# Patient Record
Sex: FEMALE | Race: ASIAN | Employment: FULL TIME | URBAN - METROPOLITAN AREA
[De-identification: names, ages, dates, MRNs, and addresses within clinical notes are randomized per-mention and may not be internally consistent; named-entity substitution may affect disease eponyms.]

---

## 2017-04-12 PROBLEM — M17.12 OSTEOARTHRITIS OF LEFT KNEE, UNSPECIFIED OSTEOARTHRITIS TYPE: Status: ACTIVE | Noted: 2017-04-12

## 2017-11-01 NOTE — LETTER
11/15/24      RE: RACHEL POLANCO  [address] [facility] [address]       To Whom It May Concern:    Rachel Polanco is a patient under my medical care.  She underwent a surgical procedure on 10/22/24 and continues to recover.  She is restricted to lifting no more than 10 pounds.  She will be unable to lift and carry her luggage due to this restriction.  Please provide baggage-handling assistance during her travels.    Thank you.      Grace Gayle MD  
independent

## 2024-10-22 ENCOUNTER — APPOINTMENT (OUTPATIENT)
Dept: ULTRASOUND IMAGING | Facility: HOSPITAL | Age: 54
End: 2024-10-22
Attending: STUDENT IN AN ORGANIZED HEALTH CARE EDUCATION/TRAINING PROGRAM
Payer: COMMERCIAL

## 2024-10-22 ENCOUNTER — HOSPITAL ENCOUNTER (INPATIENT)
Facility: HOSPITAL | Age: 54
LOS: 1 days | Discharge: HOME | End: 2024-10-23
Attending: STUDENT IN AN ORGANIZED HEALTH CARE EDUCATION/TRAINING PROGRAM | Admitting: STUDENT IN AN ORGANIZED HEALTH CARE EDUCATION/TRAINING PROGRAM
Payer: COMMERCIAL

## 2024-10-22 ENCOUNTER — APPOINTMENT (OUTPATIENT)
Dept: GENERAL RADIOLOGY | Facility: HOSPITAL | Age: 54
End: 2024-10-22
Attending: STUDENT IN AN ORGANIZED HEALTH CARE EDUCATION/TRAINING PROGRAM
Payer: COMMERCIAL

## 2024-10-22 ENCOUNTER — APPOINTMENT (OUTPATIENT)
Dept: ULTRASOUND IMAGING | Facility: HOSPITAL | Age: 54
DRG: 419 | End: 2024-10-22
Attending: STUDENT IN AN ORGANIZED HEALTH CARE EDUCATION/TRAINING PROGRAM
Payer: COMMERCIAL

## 2024-10-22 ENCOUNTER — HOSPITAL ENCOUNTER (OUTPATIENT)
Facility: HOSPITAL | Age: 54
Setting detail: OBSERVATION
Discharge: HOME | DRG: 419 | End: 2024-10-23
Attending: STUDENT IN AN ORGANIZED HEALTH CARE EDUCATION/TRAINING PROGRAM | Admitting: STUDENT IN AN ORGANIZED HEALTH CARE EDUCATION/TRAINING PROGRAM
Payer: COMMERCIAL

## 2024-10-22 ENCOUNTER — APPOINTMENT (OUTPATIENT)
Dept: GENERAL RADIOLOGY | Facility: HOSPITAL | Age: 54
DRG: 419 | End: 2024-10-22
Attending: STUDENT IN AN ORGANIZED HEALTH CARE EDUCATION/TRAINING PROGRAM
Payer: COMMERCIAL

## 2024-10-22 ENCOUNTER — HOSPITAL ENCOUNTER (OUTPATIENT)
Age: 54
Discharge: ANOTHER HOSPITAL | End: 2024-10-22
Payer: COMMERCIAL

## 2024-10-22 VITALS
DIASTOLIC BLOOD PRESSURE: 104 MMHG | RESPIRATION RATE: 18 BRPM | WEIGHT: 135 LBS | SYSTOLIC BLOOD PRESSURE: 123 MMHG | BODY MASS INDEX: 24 KG/M2 | OXYGEN SATURATION: 98 % | TEMPERATURE: 99 F | HEART RATE: 67 BPM

## 2024-10-22 DIAGNOSIS — K81.0 ACUTE CHOLECYSTITIS: Primary | ICD-10-CM

## 2024-10-22 DIAGNOSIS — R10.10 UPPER ABDOMINAL PAIN: Primary | ICD-10-CM

## 2024-10-22 DIAGNOSIS — K81.9 CHOLECYSTITIS: ICD-10-CM

## 2024-10-22 LAB
ALBUMIN SERPL-MCNC: 4.7 G/DL (ref 3.2–4.8)
ALBUMIN/GLOB SERPL: 1.3 {RATIO} (ref 1–2)
ALP LIVER SERPL-CCNC: 131 U/L
ALT SERPL-CCNC: 489 U/L
ANION GAP SERPL CALC-SCNC: 7 MMOL/L (ref 0–18)
AST SERPL-CCNC: 472 U/L (ref ?–34)
B-HCG UR QL: NEGATIVE
BASOPHILS # BLD AUTO: 0.03 X10(3) UL (ref 0–0.2)
BASOPHILS NFR BLD AUTO: 0.4 %
BILIRUB SERPL-MCNC: 1.7 MG/DL (ref 0.3–1.2)
BILIRUB UR QL CFM: NEGATIVE
BUN BLD-MCNC: 8 MG/DL (ref 9–23)
CALCIUM BLD-MCNC: 9.4 MG/DL (ref 8.7–10.4)
CHLORIDE SERPL-SCNC: 107 MMOL/L (ref 98–112)
CO2 SERPL-SCNC: 26 MMOL/L (ref 21–32)
COLOR UR AUTO: YELLOW
CREAT BLD-MCNC: 0.72 MG/DL
EGFRCR SERPLBLD CKD-EPI 2021: 101 ML/MIN/1.73M2 (ref 60–?)
EOSINOPHIL # BLD AUTO: 0.01 X10(3) UL (ref 0–0.7)
EOSINOPHIL NFR BLD AUTO: 0.1 %
ERYTHROCYTE [DISTWIDTH] IN BLOOD BY AUTOMATED COUNT: 12.4 %
GLOBULIN PLAS-MCNC: 3.5 G/DL (ref 2–3.5)
GLUCOSE BLD-MCNC: 120 MG/DL (ref 70–99)
GLUCOSE UR STRIP.AUTO-MCNC: NORMAL MG/DL
HCT VFR BLD AUTO: 37.9 %
HGB BLD-MCNC: 13.3 G/DL
IMM GRANULOCYTES # BLD AUTO: 0.02 X10(3) UL (ref 0–1)
IMM GRANULOCYTES NFR BLD: 0.2 %
KETONES UR STRIP.AUTO-MCNC: NEGATIVE MG/DL
LEUKOCYTE ESTERASE UR QL STRIP.AUTO: 500
LIPASE SERPL-CCNC: 54 U/L (ref 12–53)
LYMPHOCYTES # BLD AUTO: 1.74 X10(3) UL (ref 1–4)
LYMPHOCYTES NFR BLD AUTO: 20.7 %
MCH RBC QN AUTO: 29.2 PG (ref 26–34)
MCHC RBC AUTO-ENTMCNC: 35.1 G/DL (ref 31–37)
MCV RBC AUTO: 83.3 FL
MONOCYTES # BLD AUTO: 0.69 X10(3) UL (ref 0.1–1)
MONOCYTES NFR BLD AUTO: 8.2 %
NEUTROPHILS # BLD AUTO: 5.9 X10 (3) UL (ref 1.5–7.7)
NEUTROPHILS # BLD AUTO: 5.9 X10(3) UL (ref 1.5–7.7)
NEUTROPHILS NFR BLD AUTO: 70.4 %
NITRITE UR QL STRIP.AUTO: NEGATIVE
OSMOLALITY SERPL CALC.SUM OF ELEC: 290 MOSM/KG (ref 275–295)
PH UR STRIP.AUTO: 6.5 [PH] (ref 5–8)
PLATELET # BLD AUTO: 346 10(3)UL (ref 150–450)
POTASSIUM SERPL-SCNC: 3 MMOL/L (ref 3.5–5.1)
PROT SERPL-MCNC: 8.2 G/DL (ref 5.7–8.2)
PROT UR STRIP.AUTO-MCNC: NEGATIVE MG/DL
RBC # BLD AUTO: 4.55 X10(6)UL
RBC UR QL AUTO: NEGATIVE
SODIUM SERPL-SCNC: 140 MMOL/L (ref 136–145)
SP GR UR STRIP.AUTO: 1.01 (ref 1–1.03)
UROBILINOGEN UR STRIP.AUTO-MCNC: NORMAL MG/DL
WBC # BLD AUTO: 8.4 X10(3) UL (ref 4–11)
WBC #/AREA URNS AUTO: >50 /HPF

## 2024-10-22 PROCEDURE — 99205 OFFICE O/P NEW HI 60 MIN: CPT | Performed by: NURSE PRACTITIONER

## 2024-10-22 PROCEDURE — 99285 EMERGENCY DEPT VISIT HI MDM: CPT

## 2024-10-22 PROCEDURE — 87186 SC STD MICRODIL/AGAR DIL: CPT | Performed by: STUDENT IN AN ORGANIZED HEALTH CARE EDUCATION/TRAINING PROGRAM

## 2024-10-22 PROCEDURE — 71045 X-RAY EXAM CHEST 1 VIEW: CPT | Performed by: STUDENT IN AN ORGANIZED HEALTH CARE EDUCATION/TRAINING PROGRAM

## 2024-10-22 PROCEDURE — 87088 URINE BACTERIA CULTURE: CPT | Performed by: STUDENT IN AN ORGANIZED HEALTH CARE EDUCATION/TRAINING PROGRAM

## 2024-10-22 PROCEDURE — 81001 URINALYSIS AUTO W/SCOPE: CPT | Performed by: STUDENT IN AN ORGANIZED HEALTH CARE EDUCATION/TRAINING PROGRAM

## 2024-10-22 PROCEDURE — 96361 HYDRATE IV INFUSION ADD-ON: CPT

## 2024-10-22 PROCEDURE — 93010 ELECTROCARDIOGRAM REPORT: CPT

## 2024-10-22 PROCEDURE — 93005 ELECTROCARDIOGRAM TRACING: CPT

## 2024-10-22 PROCEDURE — 85025 COMPLETE CBC W/AUTO DIFF WBC: CPT | Performed by: STUDENT IN AN ORGANIZED HEALTH CARE EDUCATION/TRAINING PROGRAM

## 2024-10-22 PROCEDURE — 81025 URINE PREGNANCY TEST: CPT

## 2024-10-22 PROCEDURE — 96375 TX/PRO/DX INJ NEW DRUG ADDON: CPT

## 2024-10-22 PROCEDURE — 87086 URINE CULTURE/COLONY COUNT: CPT | Performed by: STUDENT IN AN ORGANIZED HEALTH CARE EDUCATION/TRAINING PROGRAM

## 2024-10-22 PROCEDURE — 83690 ASSAY OF LIPASE: CPT | Performed by: STUDENT IN AN ORGANIZED HEALTH CARE EDUCATION/TRAINING PROGRAM

## 2024-10-22 PROCEDURE — 96365 THER/PROPH/DIAG IV INF INIT: CPT

## 2024-10-22 PROCEDURE — 76700 US EXAM ABDOM COMPLETE: CPT | Performed by: STUDENT IN AN ORGANIZED HEALTH CARE EDUCATION/TRAINING PROGRAM

## 2024-10-22 PROCEDURE — 80053 COMPREHEN METABOLIC PANEL: CPT | Performed by: STUDENT IN AN ORGANIZED HEALTH CARE EDUCATION/TRAINING PROGRAM

## 2024-10-22 PROCEDURE — S0028 INJECTION, FAMOTIDINE, 20 MG: HCPCS | Performed by: STUDENT IN AN ORGANIZED HEALTH CARE EDUCATION/TRAINING PROGRAM

## 2024-10-22 RX ORDER — PROCHLORPERAZINE EDISYLATE 5 MG/ML
5 INJECTION INTRAMUSCULAR; INTRAVENOUS EVERY 8 HOURS PRN
Status: DISCONTINUED | OUTPATIENT
Start: 2024-10-22 | End: 2024-10-23

## 2024-10-22 RX ORDER — HYDROCODONE BITARTRATE AND ACETAMINOPHEN 5; 325 MG/1; MG/1
2 TABLET ORAL EVERY 4 HOURS PRN
Status: DISCONTINUED | OUTPATIENT
Start: 2024-10-22 | End: 2024-10-23

## 2024-10-22 RX ORDER — ACETAMINOPHEN 325 MG/1
650 TABLET ORAL EVERY 4 HOURS PRN
Status: DISCONTINUED | OUTPATIENT
Start: 2024-10-22 | End: 2024-10-23

## 2024-10-22 RX ORDER — ENOXAPARIN SODIUM 100 MG/ML
40 INJECTION SUBCUTANEOUS DAILY
Status: DISCONTINUED | OUTPATIENT
Start: 2024-10-23 | End: 2024-10-23

## 2024-10-22 RX ORDER — HYDROCODONE BITARTRATE AND ACETAMINOPHEN 5; 325 MG/1; MG/1
1 TABLET ORAL EVERY 4 HOURS PRN
Status: DISCONTINUED | OUTPATIENT
Start: 2024-10-22 | End: 2024-10-23

## 2024-10-22 RX ORDER — KETOROLAC TROMETHAMINE 30 MG/ML
30 INJECTION, SOLUTION INTRAMUSCULAR; INTRAVENOUS EVERY 6 HOURS
Status: DISCONTINUED | OUTPATIENT
Start: 2024-10-22 | End: 2024-10-23

## 2024-10-22 RX ORDER — ONDANSETRON 2 MG/ML
4 INJECTION INTRAMUSCULAR; INTRAVENOUS ONCE
Status: DISCONTINUED | OUTPATIENT
Start: 2024-10-22 | End: 2024-10-23

## 2024-10-22 RX ORDER — ONDANSETRON 2 MG/ML
4 INJECTION INTRAMUSCULAR; INTRAVENOUS EVERY 6 HOURS PRN
Status: DISCONTINUED | OUTPATIENT
Start: 2024-10-22 | End: 2024-10-23

## 2024-10-22 RX ORDER — FAMOTIDINE 10 MG/ML
20 INJECTION, SOLUTION INTRAVENOUS ONCE
Status: COMPLETED | OUTPATIENT
Start: 2024-10-22 | End: 2024-10-22

## 2024-10-22 NOTE — ED PROVIDER NOTES
Patient Seen in: [facility]      History     Chief Complaint   Patient presents with    Abdominal Pain     Stated Complaint: Stomach ache    Subjective:   54-year-old female accompanied by her , complaining of upper abdominal pain.  States symptoms started yesterday.  States it started across the lower abdomen.  But today seems to have moved to the upper abdomen.  She reports some nausea without vomiting.  Denies prior abdominal surgeries.  Denies chance of pregnancy.              Objective:     History reviewed. No pertinent past medical history.           Past Surgical History:   Procedure Laterality Date    Repair, complex, scalp/arms/legs, add'l 5.0 cm/<  11/5/2010    Performed by DENZEL BAIG [place] [facility] [place] [place]    Repr cmpl wnd scalp,extr 2.6-7.5  11/5/2010    Performed by DENZEL BAIG at [facility], [facility]                Social History     Socioeconomic History    Marital status:    Occupational History    Occupation: consulting.    Tobacco Use    Smoking status: Never     Passive exposure: Never    Smokeless tobacco: Never   Vaping Use    Vaping status: Never Used   Substance and Sexual Activity    Alcohol use: No    Drug use: No              Review of Systems   Constitutional: Negative.    Gastrointestinal:  Positive for abdominal pain and nausea. Negative for vomiting.   All other systems reviewed and are negative.      Positive for stated complaint: Stomach ache  Other systems are as noted in HPI.  Constitutional and vital signs reviewed.      All other systems reviewed and negative except as noted above.    Physical Exam     ED Triage Vitals [10/22/24 1837]   BP (!) 123/104   Pulse 67   Resp 18   Temp 98.5 °F (36.9 °C)   Temp src Temporal   SpO2 98 %   O2 Device None (Room air)       Current Vitals:   Vital Signs  BP: (!) 123/104  Pulse: 67  Resp: 18  Temp: 98.5 °F (36.9 °C)  Temp src: Temporal    Oxygen Therapy  SpO2: 98 %  O2 Device: None  (Room air)        Physical Exam  Vitals and nursing note reviewed.   Constitutional:       Appearance: She is well-developed. She is not ill-appearing, toxic-appearing or diaphoretic.   Pulmonary:      Effort: Pulmonary effort is normal. No respiratory distress.   Abdominal:      General: Abdomen is flat.      Palpations: Abdomen is soft.      Tenderness: There is abdominal tenderness in the epigastric area and left upper quadrant.   Skin:     General: Skin is warm and dry.      Coloration: Skin is not jaundiced or pale.   Neurological:      General: No focal deficit present.      Mental Status: She is alert.   Psychiatric:         Mood and Affect: Mood normal.             ED Course   Labs Reviewed - No data to display                MDM              Medical Decision Making  54-year-old female with upper abdominal pain.  Some nausea without vomiting.  There is some tenderness with palpation of the upper abdomen mainly to the epigastric and left upper quadrant.  No guarding or rebound.  Patient will need further evaluation in the ER, possibly needing liver and lipase enzymes which is not available in the immediate care.  She may also need advanced imaging such as CT or ultrasound rule out acute process.  She voices understanding agrees with the plan of care.  States she will have he  who is at bedside, drive her directly to [facility] emergency department in [place].  She understands not to eat or drink anything until further instructions from the ER staff.  She left in stable condition.      Speech recognition software was used during this dictation.  There may be minor errors in transcription.          Disposition and Plan     Clinical Impression:  1. Upper abdominal pain         Disposition:  Ic to ed  10/22/2024  6:48 pm    Follow-up:  No follow-up provider specified.        Medications Prescribed:  There are no discharge medications for this patient.          Supplementary Documentation:

## 2024-10-22 NOTE — ED INITIAL ASSESSMENT (HPI)
Patient states intermittent sharp upper abdominal pain for 2 days  Bloated  Last BM- normal BM

## 2024-10-23 ENCOUNTER — APPOINTMENT (OUTPATIENT)
Dept: GENERAL RADIOLOGY | Facility: HOSPITAL | Age: 54
DRG: 419 | End: 2024-10-23
Attending: STUDENT IN AN ORGANIZED HEALTH CARE EDUCATION/TRAINING PROGRAM
Payer: COMMERCIAL

## 2024-10-23 ENCOUNTER — ANESTHESIA (OUTPATIENT)
Dept: SURGERY | Facility: HOSPITAL | Age: 54
End: 2024-10-23
Payer: COMMERCIAL

## 2024-10-23 ENCOUNTER — APPOINTMENT (OUTPATIENT)
Dept: GENERAL RADIOLOGY | Facility: HOSPITAL | Age: 54
End: 2024-10-23
Attending: STUDENT IN AN ORGANIZED HEALTH CARE EDUCATION/TRAINING PROGRAM
Payer: COMMERCIAL

## 2024-10-23 ENCOUNTER — ANESTHESIA EVENT (OUTPATIENT)
Dept: SURGERY | Facility: HOSPITAL | Age: 54
End: 2024-10-23
Payer: COMMERCIAL

## 2024-10-23 VITALS
SYSTOLIC BLOOD PRESSURE: 98 MMHG | RESPIRATION RATE: 14 BRPM | WEIGHT: 132 LBS | DIASTOLIC BLOOD PRESSURE: 61 MMHG | OXYGEN SATURATION: 100 % | TEMPERATURE: 98 F | HEART RATE: 63 BPM | BODY MASS INDEX: 23.39 KG/M2 | HEIGHT: 63 IN

## 2024-10-23 LAB
ALBUMIN SERPL-MCNC: 3.7 G/DL (ref 3.2–4.8)
ALBUMIN/GLOB SERPL: 1.4 {RATIO} (ref 1–2)
ALP LIVER SERPL-CCNC: 100 U/L
ALT SERPL-CCNC: 351 U/L
ANION GAP SERPL CALC-SCNC: 5 MMOL/L (ref 0–18)
AST SERPL-CCNC: 267 U/L (ref ?–34)
BILIRUB SERPL-MCNC: 0.7 MG/DL (ref 0.3–1.2)
BUN BLD-MCNC: 11 MG/DL (ref 9–23)
CALCIUM BLD-MCNC: 8.1 MG/DL (ref 8.7–10.4)
CHLORIDE SERPL-SCNC: 113 MMOL/L (ref 98–112)
CO2 SERPL-SCNC: 25 MMOL/L (ref 21–32)
CREAT BLD-MCNC: 0.68 MG/DL
EGFRCR SERPLBLD CKD-EPI 2021: 105 ML/MIN/1.73M2 (ref 60–?)
GLOBULIN PLAS-MCNC: 2.6 G/DL (ref 2–3.5)
GLUCOSE BLD-MCNC: 88 MG/DL (ref 70–99)
OSMOLALITY SERPL CALC.SUM OF ELEC: 295 MOSM/KG (ref 275–295)
POTASSIUM SERPL-SCNC: 3.2 MMOL/L (ref 3.5–5.1)
PROT SERPL-MCNC: 6.3 G/DL (ref 5.7–8.2)
SODIUM SERPL-SCNC: 143 MMOL/L (ref 136–145)

## 2024-10-23 PROCEDURE — BF131ZZ FLUOROSCOPY OF GALLBLADDER AND BILE DUCTS USING LOW OSMOLAR CONTRAST: ICD-10-PCS | Performed by: STUDENT IN AN ORGANIZED HEALTH CARE EDUCATION/TRAINING PROGRAM

## 2024-10-23 PROCEDURE — 88304 TISSUE EXAM BY PATHOLOGIST: CPT | Performed by: STUDENT IN AN ORGANIZED HEALTH CARE EDUCATION/TRAINING PROGRAM

## 2024-10-23 PROCEDURE — 74300 X-RAY BILE DUCTS/PANCREAS: CPT | Performed by: STUDENT IN AN ORGANIZED HEALTH CARE EDUCATION/TRAINING PROGRAM

## 2024-10-23 PROCEDURE — 80053 COMPREHEN METABOLIC PANEL: CPT | Performed by: STUDENT IN AN ORGANIZED HEALTH CARE EDUCATION/TRAINING PROGRAM

## 2024-10-23 PROCEDURE — 0FT44ZZ RESECTION OF GALLBLADDER, PERCUTANEOUS ENDOSCOPIC APPROACH: ICD-10-PCS | Performed by: STUDENT IN AN ORGANIZED HEALTH CARE EDUCATION/TRAINING PROGRAM

## 2024-10-23 RX ORDER — HYDROMORPHONE HYDROCHLORIDE 1 MG/ML
0.4 INJECTION, SOLUTION INTRAMUSCULAR; INTRAVENOUS; SUBCUTANEOUS EVERY 5 MIN PRN
Status: DISCONTINUED | OUTPATIENT
Start: 2024-10-23 | End: 2024-10-23 | Stop reason: HOSPADM

## 2024-10-23 RX ORDER — ACETAMINOPHEN 500 MG
1000 TABLET ORAL ONCE AS NEEDED
Status: DISCONTINUED | OUTPATIENT
Start: 2024-10-23 | End: 2024-10-23 | Stop reason: HOSPADM

## 2024-10-23 RX ORDER — ENOXAPARIN SODIUM 100 MG/ML
40 INJECTION SUBCUTANEOUS NIGHTLY
Status: DISCONTINUED | OUTPATIENT
Start: 2024-10-23 | End: 2024-10-23

## 2024-10-23 RX ORDER — MEPERIDINE HYDROCHLORIDE 25 MG/ML
12.5 INJECTION INTRAMUSCULAR; INTRAVENOUS; SUBCUTANEOUS AS NEEDED
Status: DISCONTINUED | OUTPATIENT
Start: 2024-10-23 | End: 2024-10-23 | Stop reason: HOSPADM

## 2024-10-23 RX ORDER — PROCHLORPERAZINE EDISYLATE 5 MG/ML
5 INJECTION INTRAMUSCULAR; INTRAVENOUS EVERY 8 HOURS PRN
Status: DISCONTINUED | OUTPATIENT
Start: 2024-10-23 | End: 2024-10-23

## 2024-10-23 RX ORDER — PROCHLORPERAZINE EDISYLATE 5 MG/ML
5 INJECTION INTRAMUSCULAR; INTRAVENOUS EVERY 8 HOURS PRN
Status: DISCONTINUED | OUTPATIENT
Start: 2024-10-23 | End: 2024-10-23 | Stop reason: HOSPADM

## 2024-10-23 RX ORDER — SENNOSIDES 8.6 MG
17.2 TABLET ORAL NIGHTLY PRN
Status: DISCONTINUED | OUTPATIENT
Start: 2024-10-23 | End: 2024-10-23

## 2024-10-23 RX ORDER — ONDANSETRON 2 MG/ML
4 INJECTION INTRAMUSCULAR; INTRAVENOUS EVERY 6 HOURS PRN
Status: DISCONTINUED | OUTPATIENT
Start: 2024-10-23 | End: 2024-10-23 | Stop reason: HOSPADM

## 2024-10-23 RX ORDER — ONDANSETRON 2 MG/ML
4 INJECTION INTRAMUSCULAR; INTRAVENOUS EVERY 6 HOURS PRN
Status: DISCONTINUED | OUTPATIENT
Start: 2024-10-23 | End: 2024-10-23

## 2024-10-23 RX ORDER — HYDROMORPHONE HYDROCHLORIDE 1 MG/ML
INJECTION, SOLUTION INTRAMUSCULAR; INTRAVENOUS; SUBCUTANEOUS
Status: COMPLETED
Start: 2024-10-23 | End: 2024-10-23

## 2024-10-23 RX ORDER — LIDOCAINE HYDROCHLORIDE 10 MG/ML
INJECTION, SOLUTION EPIDURAL; INFILTRATION; INTRACAUDAL; PERINEURAL AS NEEDED
Status: DISCONTINUED | OUTPATIENT
Start: 2024-10-23 | End: 2024-10-23 | Stop reason: SURG

## 2024-10-23 RX ORDER — OXYCODONE HYDROCHLORIDE 10 MG/1
10 TABLET ORAL EVERY 4 HOURS PRN
Status: DISCONTINUED | OUTPATIENT
Start: 2024-10-23 | End: 2024-10-23

## 2024-10-23 RX ORDER — OXYCODONE HYDROCHLORIDE 5 MG/1
5 TABLET ORAL EVERY 4 HOURS PRN
Status: DISCONTINUED | OUTPATIENT
Start: 2024-10-23 | End: 2024-10-23

## 2024-10-23 RX ORDER — HYDROCODONE BITARTRATE AND ACETAMINOPHEN 5; 325 MG/1; MG/1
2 TABLET ORAL ONCE AS NEEDED
Status: DISCONTINUED | OUTPATIENT
Start: 2024-10-23 | End: 2024-10-23 | Stop reason: HOSPADM

## 2024-10-23 RX ORDER — NEOSTIGMINE METHYLSULFATE 1 MG/ML
INJECTION INTRAVENOUS AS NEEDED
Status: DISCONTINUED | OUTPATIENT
Start: 2024-10-23 | End: 2024-10-23 | Stop reason: SURG

## 2024-10-23 RX ORDER — HYDROMORPHONE HYDROCHLORIDE 1 MG/ML
0.6 INJECTION, SOLUTION INTRAMUSCULAR; INTRAVENOUS; SUBCUTANEOUS EVERY 5 MIN PRN
Status: DISCONTINUED | OUTPATIENT
Start: 2024-10-23 | End: 2024-10-23 | Stop reason: HOSPADM

## 2024-10-23 RX ORDER — HYDROMORPHONE HYDROCHLORIDE 1 MG/ML
0.8 INJECTION, SOLUTION INTRAMUSCULAR; INTRAVENOUS; SUBCUTANEOUS EVERY 2 HOUR PRN
Status: DISCONTINUED | OUTPATIENT
Start: 2024-10-23 | End: 2024-10-23

## 2024-10-23 RX ORDER — LABETALOL HYDROCHLORIDE 5 MG/ML
5 INJECTION, SOLUTION INTRAVENOUS EVERY 5 MIN PRN
Status: DISCONTINUED | OUTPATIENT
Start: 2024-10-23 | End: 2024-10-23 | Stop reason: HOSPADM

## 2024-10-23 RX ORDER — OXYCODONE HYDROCHLORIDE 5 MG/1
5 TABLET ORAL EVERY 4 HOURS PRN
Qty: 15 TABLET | Refills: 0 | Status: SHIPPED | OUTPATIENT
Start: 2024-10-23 | End: 2024-10-23

## 2024-10-23 RX ORDER — GLYCOPYRROLATE 0.2 MG/ML
INJECTION, SOLUTION INTRAMUSCULAR; INTRAVENOUS AS NEEDED
Status: DISCONTINUED | OUTPATIENT
Start: 2024-10-23 | End: 2024-10-23 | Stop reason: SURG

## 2024-10-23 RX ORDER — ACETAMINOPHEN AND CODEINE PHOSPHATE 300; 30 MG/1; MG/1
1 TABLET ORAL ONCE
Status: COMPLETED | OUTPATIENT
Start: 2024-10-23 | End: 2024-10-23

## 2024-10-23 RX ORDER — SODIUM CHLORIDE, SODIUM LACTATE, POTASSIUM CHLORIDE, CALCIUM CHLORIDE 600; 310; 30; 20 MG/100ML; MG/100ML; MG/100ML; MG/100ML
INJECTION, SOLUTION INTRAVENOUS CONTINUOUS
Status: DISCONTINUED | OUTPATIENT
Start: 2024-10-23 | End: 2024-10-23 | Stop reason: HOSPADM

## 2024-10-23 RX ORDER — HYDROMORPHONE HYDROCHLORIDE 1 MG/ML
0.4 INJECTION, SOLUTION INTRAMUSCULAR; INTRAVENOUS; SUBCUTANEOUS EVERY 2 HOUR PRN
Status: DISCONTINUED | OUTPATIENT
Start: 2024-10-23 | End: 2024-10-23

## 2024-10-23 RX ORDER — KETOROLAC TROMETHAMINE 30 MG/ML
INJECTION, SOLUTION INTRAMUSCULAR; INTRAVENOUS AS NEEDED
Status: DISCONTINUED | OUTPATIENT
Start: 2024-10-23 | End: 2024-10-23 | Stop reason: SURG

## 2024-10-23 RX ORDER — MIDAZOLAM HYDROCHLORIDE 1 MG/ML
1 INJECTION INTRAMUSCULAR; INTRAVENOUS EVERY 5 MIN PRN
Status: DISCONTINUED | OUTPATIENT
Start: 2024-10-23 | End: 2024-10-23 | Stop reason: HOSPADM

## 2024-10-23 RX ORDER — ACETAMINOPHEN AND CODEINE PHOSPHATE 300; 30 MG/1; MG/1
1 TABLET ORAL EVERY 6 HOURS PRN
Qty: 10 TABLET | Refills: 0 | Status: SHIPPED | OUTPATIENT
Start: 2024-10-23 | End: 2024-11-06 | Stop reason: ALTCHOICE

## 2024-10-23 RX ORDER — SODIUM PHOSPHATE, DIBASIC AND SODIUM PHOSPHATE, MONOBASIC 7; 19 G/230ML; G/230ML
1 ENEMA RECTAL ONCE AS NEEDED
Status: DISCONTINUED | OUTPATIENT
Start: 2024-10-23 | End: 2024-10-23

## 2024-10-23 RX ORDER — NALOXONE HYDROCHLORIDE 0.4 MG/ML
80 INJECTION, SOLUTION INTRAMUSCULAR; INTRAVENOUS; SUBCUTANEOUS AS NEEDED
Status: DISCONTINUED | OUTPATIENT
Start: 2024-10-23 | End: 2024-10-23 | Stop reason: HOSPADM

## 2024-10-23 RX ORDER — BISACODYL 10 MG
10 SUPPOSITORY, RECTAL RECTAL
Status: DISCONTINUED | OUTPATIENT
Start: 2024-10-23 | End: 2024-10-23

## 2024-10-23 RX ORDER — DEXAMETHASONE SODIUM PHOSPHATE 4 MG/ML
VIAL (ML) INJECTION AS NEEDED
Status: DISCONTINUED | OUTPATIENT
Start: 2024-10-23 | End: 2024-10-23 | Stop reason: SURG

## 2024-10-23 RX ORDER — POLYETHYLENE GLYCOL 3350 17 G/17G
17 POWDER, FOR SOLUTION ORAL DAILY PRN
Status: DISCONTINUED | OUTPATIENT
Start: 2024-10-23 | End: 2024-10-23

## 2024-10-23 RX ORDER — HYDROMORPHONE HYDROCHLORIDE 1 MG/ML
0.2 INJECTION, SOLUTION INTRAMUSCULAR; INTRAVENOUS; SUBCUTANEOUS EVERY 5 MIN PRN
Status: DISCONTINUED | OUTPATIENT
Start: 2024-10-23 | End: 2024-10-23 | Stop reason: HOSPADM

## 2024-10-23 RX ORDER — ROCURONIUM BROMIDE 10 MG/ML
INJECTION, SOLUTION INTRAVENOUS AS NEEDED
Status: DISCONTINUED | OUTPATIENT
Start: 2024-10-23 | End: 2024-10-23 | Stop reason: SURG

## 2024-10-23 RX ORDER — HYDROCODONE BITARTRATE AND ACETAMINOPHEN 5; 325 MG/1; MG/1
1 TABLET ORAL ONCE AS NEEDED
Status: DISCONTINUED | OUTPATIENT
Start: 2024-10-23 | End: 2024-10-23 | Stop reason: HOSPADM

## 2024-10-23 RX ORDER — ONDANSETRON 2 MG/ML
INJECTION INTRAMUSCULAR; INTRAVENOUS AS NEEDED
Status: DISCONTINUED | OUTPATIENT
Start: 2024-10-23 | End: 2024-10-23 | Stop reason: SURG

## 2024-10-23 RX ADMIN — GLYCOPYRROLATE 0.2 MG: 0.2 INJECTION, SOLUTION INTRAMUSCULAR; INTRAVENOUS at 15:05:00

## 2024-10-23 RX ADMIN — LIDOCAINE HYDROCHLORIDE 50 MG: 10 INJECTION, SOLUTION EPIDURAL; INFILTRATION; INTRACAUDAL; PERINEURAL at 13:36:00

## 2024-10-23 RX ADMIN — GLYCOPYRROLATE 0.4 MG: 0.2 INJECTION, SOLUTION INTRAMUSCULAR; INTRAVENOUS at 14:53:00

## 2024-10-23 RX ADMIN — ROCURONIUM BROMIDE 50 MG: 10 INJECTION, SOLUTION INTRAVENOUS at 13:36:00

## 2024-10-23 RX ADMIN — NEOSTIGMINE METHYLSULFATE 3 MG: 1 INJECTION INTRAVENOUS at 14:53:00

## 2024-10-23 RX ADMIN — DEXAMETHASONE SODIUM PHOSPHATE 8 MG: 4 MG/ML VIAL (ML) INJECTION at 13:46:00

## 2024-10-23 RX ADMIN — ONDANSETRON 4 MG: 2 INJECTION INTRAMUSCULAR; INTRAVENOUS at 14:55:00

## 2024-10-23 RX ADMIN — KETOROLAC TROMETHAMINE 30 MG: 30 INJECTION, SOLUTION INTRAMUSCULAR; INTRAVENOUS at 14:55:00

## 2024-10-23 RX ADMIN — NEOSTIGMINE METHYLSULFATE 2 MG: 1 INJECTION INTRAVENOUS at 15:05:00

## 2024-10-23 NOTE — ANESTHESIA POSTPROCEDURE EVALUATION
[facility]    Rachel Polanco Patient Status:  Inpatient   Age/Gender 54 year old female MRN [id number]   Location [facility] SURGERY Attending Grace Gayle MD   Hosp Day # 1 PCP Celine Cifuentes MD       Anesthesia Post-op Note    LAPAROSCOPIC CHOLECYSTECTOMY WITH INTRAOPERATIVE CHOLANGIOGRAM    Procedure Summary       Date: 10/23/24 Room / Location:  MAIN OR 14 /  MAIN OR    Anesthesia Start: 1333 Anesthesia Stop: 1515    Procedure: LAPAROSCOPIC CHOLECYSTECTOMY WITH INTRAOPERATIVE CHOLANGIOGRAM (Abdomen) Diagnosis:       Cholecystitis      (Cholecystitis [K81.9])    Surgeons: Garce Gayle MD Anesthesiologist: Giovanny Herron MD    Anesthesia Type: general ASA Status: 2            Anesthesia Type: general    Vitals Value Taken Time   /72 10/23/24 1513   Temp 100.3 10/23/24 1517   Pulse 69 10/23/24 1516   Resp 12 10/23/24 1516   SpO2 94 % 10/23/24 1516   Vitals shown include unfiled device data.    Patient Location: PACU    Anesthesia Type: general    Airway Patency: patent    Postop Pain Control: adequate    Mental Status: mildly sedated but able to meaningfully participate in the post-anesthesia evaluation    Nausea/Vomiting: none    Cardiopulmonary/Hydration status: stable euvolemic    Complications: no apparent anesthesia related complications    Postop vital signs: stable    Dental Exam: Unchanged from Preop    Patient to be discharged from PACU when criteria met.

## 2024-10-23 NOTE — ANESTHESIA PROCEDURE NOTES
Airway  Date/Time: 10/23/2024 1:37 PM  Urgency: elective      General Information and Staff    Patient location during procedure: OR  Anesthesiologist: Giovanny Herron MD  Performed: anesthesiologist   Performed by: Giovanny Herron MD  Authorized by: Giovanny Herron MD      Indications and Patient Condition  Indications for airway management: anesthesia  Sedation level: deep  Preoxygenated: yes  Patient position: sniffing  Mask difficulty assessment: 1 - vent by mask    Final Airway Details  Final airway type: endotracheal airway      Successful airway: ETT  Cuffed: yes   Successful intubation technique: direct laryngoscopy  Endotracheal tube insertion site: oral  Blade size: #3  ETT size (mm): 7.0    Cormack-Lehane Classification: grade I - full view of glottis  Placement verified by: capnometry   Measured from: lips  ETT to lips (cm): 21  Number of attempts at approach: 1

## 2024-10-23 NOTE — OPERATIVE REPORT
[facility]  Operative Note    Rachel Polanco Location: OR   CSN [id number] MRN [id number]    10/7/1970 Age 54 year old   Admission Date 10/22/2024 Operation Date 10/23/2024   Attending Physician Grace Gayle MD Operating Physician Grace Gayle MD   PCP Celine Cifuentes MD        Patient Name: Rachel Polanco    Preoperative Diagnosis: Cholecystitis [K81.9]    Postoperative Diagnosis: Same as pre-op diagnosis.    Primary Surgeon: Grace Gayle MD     Assistant: Luz Marina Borrego PA-C    The assistance of Luz Marina Borrego PA-C was absolutely essential to the proper conduct of this case and further assisted with performance of the cholangiogram.       Anesthesia: General    Anesthesiologist: Anesthesiologist.: Giovanny Herron MD    Procedures:  Laparoscopic Cholecystectomy with intraoperative cholangiogram    Implants: None    Specimen: Gallbladder    Drains: None    Estimated Blood Loss: Blood Output: 10 mL (10/23/2024  2:51 PM)       Complications: none    Condition: Good    Indications for Surgery:   Rachel Polanco is a 54 year old female who presents with progressive epigastric and right upper quadrant abdominal pain. Work-up revealed acute cholecystitis. The patient presents today for laparoscopic cholecystectomy.    Surgical Findings:   Normal cholangiogram  Acute cholecystitis     Description of Procedure:   The patient was transported to the operating room and placed on the operating table in supine position.General endotracheal anesthesia was administered. Preoperative antibiotics were given. The abdomen was prepped and draped in sterile fashion. A time-out was performed.  A stab incision was made in the left upper quadrant at Ulrich's point. A Veress needle was passed into the peritoneal cavity and pneumoperitoneum was established to a pressure of 15 mmHg. A supraumbilical incision was made.  An 11 mm optical trocar was placed through this incisionunder direct vision. Upon initial inspection of  the abdominal cavity there was no injury from our entry. There was no unexpected abnormality of the visible abdominal organs.  Attention was turned to the right upper quadrant. The patient was positioned with head up, right side up. Three 5-mm incisions were made in the upper abdomen - one in the subxiphoid position and two in the subcostal region, through which 5-mm trocars were placed under direct visualization. The gallbladder dome was grasped and elevated,. The infundibulum was retracted. Dissection was initiated at the triangle of Calot.  After clearing the peritoneum and connective tissue the cystic duct and cystic artery were identified superior to the line of Rouviere. The infundibulum was dissected away from the liver bed. The critical view of safety was obtained.   Next, a clip was placed on the specimen side of the cystic duct. A cystic ductotomy was created with endoshears through which a cholangiocatheter was introduced. A cholangiogram was performed. The cholangiogram reveals brisk and immediate contrast excretion into the duodenum. The cystic duct, common bile duct, common hepatic duct, Right and left hepatic ducts, and intrahepatic biliary radicals were all free of lesions, stenoses, strictures, filling defects, or other abnormalities. Representative images were submitted to the radiologist. The cholangiocatheter was removed, two clips were placed on the patient's side of the cystic duct, and the duct was divided. Next, one clip was placed on the cystic artery on the specimen side, two towards the patient side, and the cystic artery was divided with the Endoshears. The gallbladder was elevated from the gallbladder fossa using electrocautery. Once the gallbladder was dissected from the gallbladder fossa it was placed in an endocatch specimen bag and set aside.   Hemostasis on the gallbladder fossa was achieved with electrocautery. The right upper quadrant was then irrigated until the effluent fluid  was clear.  The previously placed clips on the cystic duct and cystic artery were visualized and inspected; they were well approximated, well situated, and there was no evidence of active bleeding or bile leak. The specimen was then extracted from the umbilical trocar site.  The fascia at the umbilicus was reapproximated using #1 PDS suture on a suture passer. Pneumoperitoneum was released and trocars removed. All skin incisions were cleansed, irrigated, and injected with local anesthetic. Skin incisions were reapproximated using subcuticular 4-0 Monocryl suture.  Skin glue was used to seal all the incisions.  The patient was awakened from anesthesia and brought to the recovery room in good condition. The patient tolerated the procedure well without apparent complication. All sponge, needle, and instrument counts were correct at the end of the case.    Grace Gayle MD   10/23/2024  3:38 PM

## 2024-10-23 NOTE — ANESTHESIA PREPROCEDURE EVALUATION
PRE-OP EVALUATION    Patient Name: Rachel Polanco    Admit Diagnosis: Acute cholecystitis [K81.0]    Pre-op Diagnosis: Cholecystitis [K81.9]    LAPAROSCOPIC CHOLECYSTECTOMY WITH INTRAOPERATIVE CHOLANGIOGRAM, POSSIBLE OPEN    Anesthesia Procedure: LAPAROSCOPIC CHOLECYSTECTOMY WITH INTRAOPERATIVE CHOLANGIOGRAM, POSSIBLE OPEN    Surgeons and Role:     * Grace Gayle MD - Primary    Pre-op vitals reviewed.  Temp: 98.2 °F (36.8 °C)  Pulse: 66  Resp: 18  BP: 101/66  SpO2: 99 %  Body mass index is 23.38 kg/m².    Current medications reviewed.  Hospital Medications:   [COMPLETED] famotidine (Pepcid) 20 mg/2mL injection 20 mg  20 mg Intravenous Once    [COMPLETED] sodium chloride 0.9 % IV bolus 1,000 mL  1,000 mL Intravenous Once    ondansetron (Zofran) 4 MG/2ML injection 4 mg  4 mg Intravenous Once    [COMPLETED] ceFEPIme (Maxpime) 2 g in sodium chloride 0.9% 100 mL IVPB-MBP  2 g Intravenous Once    acetaminophen (Tylenol) tab 650 mg  650 mg Oral Q4H PRN    Or    HYDROcodone-acetaminophen (Norco) 5-325 MG per tab 1 tablet  1 tablet Oral Q4H PRN    Or    HYDROcodone-acetaminophen (Norco) 5-325 MG per tab 2 tablet  2 tablet Oral Q4H PRN    enoxaparin (Lovenox) 40 MG/0.4ML SUBQ injection 40 mg  40 mg Subcutaneous Daily    ketorolac (Toradol) 30 MG/ML injection 30 mg  30 mg Intravenous Q6H    ondansetron (Zofran) 4 MG/2ML injection 4 mg  4 mg Intravenous Q6H PRN    prochlorperazine (Compazine) 10 MG/2ML injection 5 mg  5 mg Intravenous Q8H PRN    piperacillin-tazobactam (Zosyn) 3.375 g in dextrose 5% 100 mL IVPB-ADDV  3.375 g Intravenous Q8H    influenza virus trivalent PF (Fluzone Trivalent) 0.5 mL IM injection (ages 6 months to 64 years) 0.5 mL  0.5 mL Intramuscular Prior to discharge       Outpatient Medications:   Prescriptions Prior to Admission[1]    Allergies: Pseudoephedrine      Anesthesia Evaluation    Patient summary reviewed.    Anesthetic Complications           GI/Hepatic/Renal    Negative GI/hepatic/renal  ROS.                             Cardiovascular    Negative cardiovascular ROS.                                                   Endo/Other    Negative endo/other ROS.                              Pulmonary    Negative pulmonary ROS.                       Neuro/Psych    Negative neuro/psych ROS.                          Patient Active Problem List:     Chondromalacia of left knee     Lateral knee pain, left     Osteoarthritis of left knee, unspecified osteoarthritis type     Acute cholecystitis           Past Surgical History:   Procedure Laterality Date    Repair, complex, scalp/arms/legs, add'l 5.0 cm/<  11/5/2010    Performed by DENZEL BAIG at [facility], [facility]    Repr cmpl wnd scalp,extr 2.6-7.5  11/5/2010    Performed by DENZEL BAIG at [facility], [facility]     Social History     Socioeconomic History    Marital status:    Occupational History    Occupation: consulting.    Tobacco Use    Smoking status: Never     Passive exposure: Never    Smokeless tobacco: Never   Vaping Use    Vaping status: Never Used   Substance and Sexual Activity    Alcohol use: No    Drug use: No     History   Drug Use No     Available pre-op labs reviewed.  Lab Results   Component Value Date    WBC 8.4 10/22/2024    RBC 4.55 10/22/2024    HGB 13.3 10/22/2024    HCT 37.9 10/22/2024    MCV 83.3 10/22/2024    MCH 29.2 10/22/2024    MCHC 35.1 10/22/2024    RDW 12.4 10/22/2024    .0 10/22/2024     Lab Results   Component Value Date     10/23/2024    K 3.2 (L) 10/23/2024     (H) 10/23/2024    CO2 25.0 10/23/2024    BUN 11 10/23/2024    CREATSERUM 0.68 10/23/2024    GLU 88 10/23/2024    CA 8.1 (L) 10/23/2024            Airway      Mallampati: II  Mouth opening: >3 FB  TM distance: > 6 cm  Neck ROM: full Cardiovascular    Cardiovascular exam normal.         Dental    Dentition appears grossly intact         Pulmonary    Pulmonary exam normal.                 Other findings              ASA: 2    Plan: general  NPO status verified and patient meets guidelines.        Comment:  I explained intrinsic risks of general anesthesia, including nausea, dental damage, sore throat, mouth injury,and hoarseness from airway management.  All questions were answered and understanding was demonstrated of risks.  Informed permission was obtained to proceed as documented in the signed consent form.        Plan/risks discussed with: patient                Present on Admission:  **None**             [1]   No medications prior to admission.

## 2024-10-23 NOTE — H&P
[facility]  Report of  Surgical Consultation with History and Physical Exam    Rachel Polanco Patient Status:  Inpatient    10/7/1970 MRN [id number]   Location [facility] 0SW-A Attending Grace Gayle MD   Hosp Day # 1 PCP Celine Cifuentes MD     Reason for Surgical Consultation:  I am seeing this patient at the request of Dr. Saenz for cholelithiasis.     Chief Complaint:   Abdominal pain    History of Present Illness:  Rachel Polanco is a a(n) 54 year old female who presented to the ER with 2 days of intermittent abdominal pain.    The patient states her pain began Monday evening when she was at [facility] about to travel out of town for work.  She states the pain was periumbilical and severe.  She was diaphoretic.  She states she was evaluated by paramedics and the airport who attributed her pain to gas pain and recommended follow-up if her pain persisted.    The patient states when she returned home, her pain improved.  She took some Pepcid and Pepto-Bismol with some relief.    She states yesterday morning, she woke up without any abdominal pain.  Her pain worsened in the afternoon after evening yogurt.  She did have 1 episode of nausea and vomiting yesterday morning.    She denies pain similar to this in the past.  She denies a history of postprandial abdominal pain.  She states she did eat cheese curds and French fries on  evening.    Upon presentation to the emergency department, abdominal ultrasound revealed small stones in the gallbladder, subtle pericholecystic fluid gallbladder wall thickening.  Common bile duct diameter measured at 5 mm.  All other significant findings may be seen in the radiologist report.    The patient's vital signs are stable.  CBC is within normal limits.  AST ALT, alkaline phosphatase and total bilirubin initially elevated at 472, 49, 131 and 1.7 respectively.  Today, total bilirubin and alkaline phosphatase are within normal limits.  ALT and AST remain  elevated but trending down at 351 and 267 respectively.    The patient has no significant past surgical history.  She does not take any blood thinners.  She has no significant past medical history.    Patient is hesitant to have surgery because she travels weekly for work.  If possible she would like to postpone surgery until mid December.    History:  History reviewed. No pertinent past medical history.  Past Surgical History:   Procedure Laterality Date    Repair, complex, scalp/arms/legs, add'l 5.0 cm/<  11/5/2010    Performed by DENZEL BAIG at [facility], [facility]    Repr cmpl wnd scalp,extr 2.6-7.5  11/5/2010    Performed by DENZEL BAIG at [facility], [facility]     Family History   Problem Relation Age of Onset    Diabetes Mother     Other (bladder cancer) Mother     Diabetes Father       reports that she has never smoked. She has never been exposed to tobacco smoke. She has never used smokeless tobacco. She reports that she does not drink alcohol and does not use drugs.    Allergies:  Allergies[1]    Medications:    Current Facility-Administered Medications:     ondansetron (Zofran) 4 MG/2ML injection 4 mg, 4 mg, Intravenous, Once    acetaminophen (Tylenol) tab 650 mg, 650 mg, Oral, Q4H PRN **OR** HYDROcodone-acetaminophen (Norco) 5-325 MG per tab 1 tablet, 1 tablet, Oral, Q4H PRN **OR** HYDROcodone-acetaminophen (Norco) 5-325 MG per tab 2 tablet, 2 tablet, Oral, Q4H PRN    enoxaparin (Lovenox) 40 MG/0.4ML SUBQ injection 40 mg, 40 mg, Subcutaneous, Daily    ketorolac (Toradol) 30 MG/ML injection 30 mg, 30 mg, Intravenous, Q6H    ondansetron (Zofran) 4 MG/2ML injection 4 mg, 4 mg, Intravenous, Q6H PRN    prochlorperazine (Compazine) 10 MG/2ML injection 5 mg, 5 mg, Intravenous, Q8H PRN    piperacillin-tazobactam (Zosyn) 3.375 g in dextrose 5% 100 mL IVPB-ADDV, 3.375 g, Intravenous, Q8H    influenza virus trivalent PF (Fluzone Trivalent) 0.5 mL IM injection (ages 6 months to 64 years) 0.5 mL,  0.5 mL, Intramuscular, Prior to discharge    Review of Systems:  Pertinent items are noted in HPI.  Allergic/Immuno:  Review of patient's allergies performed.  Cardiovascular:  Negative for cool extremity and irregular heartbeat/palpitations  Constitutional:  Negative for decreased activity, fever, irritability and lethargy  Endocrine:  Negative for abnormal sleep patterns, increased activity, polydipsia and polyphagia  ENMT:  Negative for ear drainage, hearing loss and nasal drainage  Eyes:  Negative for eye discharge and vision loss  Gastrointestinal: Positive for abdominal pain   Genitourinary:  Negative for dysuria and hematuria  Hema/Lymph:  Negative for easy bleeding and easy bruising  Integumentary:  Negative for pruritus and rash  Musculoskeletal:  Negative for bone/joint symptoms  Neurological:  Negative for gait disturbance  Psychiatric:  Negative for inappropriate interaction and psychiatric symptoms  Respiratory:  Negative for cough, dyspnea and wheezing      Physical Exam:  Blood pressure 97/75, pulse 70, temperature 98.6 °F (37 °C), temperature source Oral, resp. rate 16, height 63\", weight 132 lb (59.9 kg), last menstrual period 10/15/2024, SpO2 98%.    General: Alert, orientated x3.  Cooperative.  Not in apparent distress.    HEENT: Exam is unremarkable.  Without scleral icterus.  Mucous membranes are moist. EOM are WNL.    Neck: No tenderness to palpitation.  Full range of motion to flexion and extension, lateral rotation and lateral flexion of cervical spine.  No JVD. Supple.     Lungs: No secondary use of accessory respiratory musculature.    Abdomen: Soft, nondistended without tympany to percussion.  Abdomen is nontender to palpation throughout.  No rebound or guarding.  No peritoneal signs.    Extremities:  No lower extremity edema noted.  Without clubbing or cyanosis.      Skin: Normal texture and turgor.      Laboratory Data and Relevant X-rays:  Recent Labs   Lab 10/22/24  1936   RBC 4.55    HGB 13.3   HCT 37.9   MCV 83.3   MCH 29.2   MCHC 35.1   RDW 12.4   NEPRELIM 5.90   WBC 8.4   .0       Recent Labs   Lab 10/22/24  1936 10/23/24  0506   * 88   BUN 8* 11   CREATSERUM 0.72 0.68   CA 9.4 8.1*   ALB 4.7 3.7    143   K 3.0* 3.2*    113*   CO2 26.0 25.0   ALKPHO 131* 100   * 267*   * 351*   BILT 1.7* 0.7   TP 8.2 6.3         No results for input(s): \"PTP\", \"INR\", \"PTT\" in the last 168 hours.    Imaging    Narrative   PROCEDURE:  US ABDOMEN COMPLETE (CPT=76700)     COMPARISON:  None.     INDICATIONS:  abdominal pain     TECHNIQUE:  Real time gray-scale ultrasound was used to evaluate the abdomen.  The exam includes images of the liver, gallbladder, common bile duct, pancreas, spleen, kidneys, IVC, and aorta.     PATIENT STATED HISTORY: (As transcribed by Technologist)  Patient is having epigastric pain with nausea and one episode of vomiting since yesterday.         FINDINGS:  The technologist notes a difficult exam due to overlying bowel gas.  LIVER:  Normal size and echogenicity. No significant masses.  BILIARY:  Small stones are likely present within the gallbladder.  Subtle pericholecystic fluid and gallbladder wall thickening is favored with the wall measuring up to 4 mm. Common bile duct diameter is 5 mm.  PANCREAS:  Pancreas not well seen due to overlying bowel gas.  SPLEEN:  Spleen measures up to 8.3 cm in length.  KIDNEYS:  Normal.  Right kidney measures 10.4 cm.  Left kidney measures 11.6 cm.  AORTA/IVC:  Visualized aorta and IVC are unremarkable.                      Impression   CONCLUSION:  Gallstones, pericholecystic fluid or gallbladder wall thickening.  The findings are suggestive of cholecystitis.  If there is persistent clinical concern then consider HIDA scan.        LOCATION:  [facility]           Dictated by (CST): Quirino Contreras MD on 10/22/2024 at 9:49 PM      Finalized by (CST): Quirino Contreras MD on 10/22/2024 at 9:52 PM     Tabitha Villanueva  NHAN  10/23/2024  8:15 AM    Is this a shared or split note between Advanced Practice Provider and Physician? Yes    Impression:  Patient Active Problem List   Diagnosis    Chondromalacia of left knee    Lateral knee pain, left    Osteoarthritis of left knee, unspecified osteoarthritis type    Acute cholecystitis       54 year old female who presents with 2 day history of abdominal pain in the right upper quadrant and the epigastrium. Pain was worse after eating. She is tender in the right upper quadrant. I reviewed her ultrasound and this shows cholelithiasis, gallbladder wall thickening and pericholecystic fluid consistent with acute cholecystitis. The common bile duct of normal caliber. Her LFTs were elevated with t bili at 1.7 but now down trending with a normalized total bilirubin. The benefits of surgical intervention to address the underlying pathology were discussed with the patient. The alternatives to surgery including non-operative treatment with symptoms control were discussed with the patient. The risks of surgical intervention were explained to the patient in detail including but not limited to intra-operative or post-operative bleeding, post-operative incision or intra-abdominal infection, incorrect diagnosis, injury to adjacent organs and structures, and bile duct injury requiring possible immediate or delayed reconstruction potentially by a hepatobiliary surgeon, postoperative bile leak,  retained common bile duct stones, the need for post-procedure ERCP as well as the need for further therapeutic diagnostic or surgical intervention. The possibility of conversion to open procedure was also explained to the patient. The patient did voice understanding. Allpertinent questions were answered to the patient's satisfaction after which the patient provided willing and informed consent to proceed with surgery.    Grace Gayle MD   [facility]          [1]   Allergies  Allergen Reactions     Pseudoephedrine

## 2024-10-23 NOTE — ANESTHESIA POSTPROCEDURE EVALUATION
[facility]    Rachel Polanco Patient Status:  Inpatient   Age/Gender 54 year old female MRN [id number]   Location [facility] SURGERY Attending Grace Gayle MD   Hosp Day # 1 PCP Celine Cifuentes MD       Anesthesia Post-op Note    LAPAROSCOPIC CHOLECYSTECTOMY WITH INTRAOPERATIVE CHOLANGIOGRAM    Procedure Summary       Date: 10/23/24 Room / Location:  MAIN OR 14 /  MAIN OR    Anesthesia Start: 1333 Anesthesia Stop: 1515    Procedure: LAPAROSCOPIC CHOLECYSTECTOMY WITH INTRAOPERATIVE CHOLANGIOGRAM (Abdomen) Diagnosis:       Cholecystitis      (Cholecystitis [K81.9])    Surgeons: Grace Gayle MD Anesthesiologist: Giovanny Herron MD    Anesthesia Type: general ASA Status: 2            Anesthesia Type: general    Vitals Value Taken Time   /72 10/23/24 1513   Temp 100.3 10/23/24 1516   Pulse 69 10/23/24 1516   Resp 12 10/23/24 1516   SpO2 94 % 10/23/24 1516   Vitals shown include unfiled device data.    Anesthesia Postop Note

## 2024-10-23 NOTE — ED QUICK NOTES
Orders for admission, patient is aware of plan and ready to go upstairs. Any questions, please call ED RN [phone] at extension blanca.     Patient Covid vaccination status: Fully vaccinated     COVID Test Ordered in ED: None    COVID Suspicion at Admission: N/A    Running Infusions:      Mental Status/LOC at time of transport: x4    Other pertinent information:   CIWA score: N/A   NIH score:  N/A

## 2024-10-23 NOTE — ED PROVIDER NOTES
Patient Seen in: [facility] Emergency Department      History     Chief Complaint   Patient presents with    Abdomen/Flank Pain     Stated Complaint: abdominal pain    Subjective:   HPI      The patient is a 54-year-old female presented to the emergency room with reports of abdominal pain.  Symptoms started yesterday as she was in the airport on her way to the flight.  Pain was severe at that time a and located in epigastric region.  EMS did evaluate her at the airport but has her vitals were fine patient opted to just go home.  Patient is taken some Pepto-Bismol and states that she ate very little and slept the night away.    Objective:     History reviewed. No pertinent past medical history.           Past Surgical History:   Procedure Laterality Date    Repair, complex, scalp/arms/legs, add'l 5.0 cm/<  11/5/2010    Performed by DENZEL BAIG at [facility] [place]    Repr cmpl wnd scalp,extr 2.6-7.5  11/5/2010    Performed by DENZEL BAIG at [facility], [facility]                Social History     Socioeconomic History    Marital status:    Occupational History    Occupation: consulting.    Tobacco Use    Smoking status: Never     Passive exposure: Never    Smokeless tobacco: Never   Vaping Use    Vaping status: Never Used   Substance and Sexual Activity    Alcohol use: No    Drug use: No                  Physical Exam     ED Triage Vitals [10/22/24 1915]   /78   Pulse 73   Resp 16   Temp 98.9 °F (37.2 °C)   Temp src Oral   SpO2 99 %   O2 Device None (Room air)       Current Vitals:   Vital Signs  BP: 118/78  Pulse: 73  Resp: 16  Temp: 98.9 °F (37.2 °C)  Temp src: Oral    Oxygen Therapy  SpO2: 99 %  O2 Device: None (Room air)        Physical Exam  Vitals and nursing note reviewed.   Constitutional:       General: She is not in acute distress.     Appearance: Normal appearance.   HENT:      Head: Normocephalic.      Nose: Nose normal.      Mouth/Throat:      Mouth: Mucous  membranes are moist.   Eyes:      Extraocular Movements: Extraocular movements intact.      Pupils: Pupils are equal, round, and reactive to light.   Cardiovascular:      Rate and Rhythm: Normal rate and regular rhythm.      Pulses: Normal pulses.   Pulmonary:      Effort: Pulmonary effort is normal.   Abdominal:      General: Abdomen is flat. Bowel sounds are normal. There is no distension.      Palpations: Abdomen is soft.      Tenderness: There is abdominal tenderness in the epigastric area. There is no right CVA tenderness, left CVA tenderness, guarding or rebound.      Hernia: No hernia is present.   Musculoskeletal:         General: No swelling or tenderness. Normal range of motion.      Cervical back: Normal range of motion.   Skin:     General: Skin is warm and dry.   Neurological:      Mental Status: She is alert and oriented to person, place, and time. Mental status is at baseline.   Psychiatric:         Mood and Affect: Mood normal.             ED Course     Labs Reviewed   COMP METABOLIC PANEL (14) - Abnormal; Notable for the following components:       Result Value    Glucose 120 (*)     Potassium 3.0 (*)     BUN 8 (*)      (*)      (*)     Alkaline Phosphatase 131 (*)     Bilirubin, Total 1.7 (*)     All other components within normal limits   LIPASE - Abnormal; Notable for the following components:    Lipase 54 (*)     All other components within normal limits   URINALYSIS WITH CULTURE REFLEX - Abnormal; Notable for the following components:    Clarity Urine Turbid (*)     Leukocyte Esterase Urine 500 (*)     WBC Urine >50 (*)     RBC Urine 6-10 (*)     Bacteria Urine 2+ (*)     Squamous Epi. Cells Few (*)     All other components within normal limits   ICTOTEST - Normal   POCT PREGNANCY URINE - Normal   CBC WITH DIFFERENTIAL WITH PLATELET   RAINBOW DRAW LAVENDER   RAINBOW DRAW LIGHT GREEN   URINE CULTURE, ROUTINE     EKG    Rate, intervals and axes as noted on EKG Report.  Rate:  65  Rhythm: Sinus Rhythm  Reading: no zachary/dep or t wave inversions             US ABDOMEN COMPLETE (CPT=76700)    Result Date: 10/22/2024  CONCLUSION:  Gallstones, pericholecystic fluid or gallbladder wall thickening.  The findings are suggestive of cholecystitis.  If there is persistent clinical concern then consider HIDA scan.   LOCATION:  [facility]    Dictated by (CST): Quirino Contreras MD on 10/22/2024 at 9:49 PM     Finalized by (CST): Quirino Contreras MD on 10/22/2024 at 9:52 PM       XR CHEST AP PORTABLE  (CPT=71045)    Result Date: 10/22/2024  CONCLUSION:  See above.   LOCATION:  [facility]      Dictated by (CST): Quirino Contreras MD on 10/22/2024 at 8:39 PM     Finalized by (CST): Quirino Contreras MD on 10/22/2024 at 8:40 PM             MDM      Medical Decision Making  The differential includes the following  GERD/gastritis, pancreatitis, cholecystitis, choledocholithiasis, small bowel obstruction, ACS    Pertinent comorbidities include  As listed above    Pertinent social history includes  Listed above      Labs  T. bili 1.7 and AST ALT in the 400 range, white blood cell count is normal, paced 54    Imaging studies  I reviewed the images and my independent interpreation after review is no pneumonia. Additionaly, I reviewd the radiology report that states the following gallstones and pericholecystic fluid on ultrasound concerning for acute cholecystitis    External data reviewed    Discussion of management with external providers    ER course  Patient's initial vitals patient is afebrile hemodynamically stable here.  She is tender in epigastric region with a negative Best's.  Labs with elevated LFTs and T. bili.  Ultrasound obtained which shows signs for acute cholecystitis.  General surgery consulted.  Patient has no chronic medical problems therefore she will be admitted to general surgery.  She has been made n.p.o. and given 2 g of cefepime advancer the patient has been questions to the best my ability.    Disposition and Plan      Clinical Impression:  1. Acute cholecystitis         Disposition:  Admit  10/22/2024 10:01 pm    Follow-up:  No follow-up provider specified.        Medications Prescribed:  There are no discharge medications for this patient.          Supplementary Documentation:         Hospital Problems       Present on Admission  Date Reviewed: 10/22/2024            ICD-10-CM Noted POA    * (Principal) Acute cholecystitis K81.0 10/22/2024 Unknown

## 2024-10-23 NOTE — DISCHARGE INSTRUCTIONS
Home Care Instructions  Cholecystectomy        WHAT TO EXPECT  You may feel pain at the incisions. This is due to stitches placed during the surgery.    You may feel pain in the shoulders. This is due to irritation of the diaphragm by the air used to inflate the abdomen.    You may feel a sore throat. This is due to the breathing tube used during surgery.     You may feel mild nausea and vomiting for the first 24 hours, this should resolve quickly.    You may have constipation, especially if taking narcotic pain medications. If you have not had a bowel movement by 48 hours after surgery, take Miralax 17g (one cap full) every 12 hours until you have a bowel movement. If another 24 hours goes by without a bowel movement, then take a dose of magnesium citrate or milk of magnesia.     MEDICATIONS  Take 2 Extra Strength Tylenol (1000mg every) 8 hours for pain. For the first 3 days it is best to take the Tylenol every 8 hours even if you do not feel much pain.     For moderate to severe pain take one Oxycodone pill (5mg) or Norco (325/5mg) every six hours as needed for pain. If you do not feel that narcotics are necessary you shouldn’t take them. If the pain is severe you can take two pills (10mg) every six hours.    You can take Advil (ibuprofen) 800mg every 8 hours or Alleve (naproxen) 500mg every 12 hours.     Please ask your surgeon before resuming blood thinners such as Aspirin, Plavix, Coumadin, Warfarin, Eliquis, or Xarelto. All other home medications may be resumed as scheduled.    Norco contains acetaminophen which is the active ingredient in Tylenol. Do not exceed the recommended dose of 4000mg of Acetaminophen within 24 hours from all sources (norco and tylenol).    DIET  Start with a light and bland diet and slowly advance to regular food as your appetite improves. There are no specific food restrictions. Do not eat excessively. Eat small frequent meals.     Drink plenty of water. Try to eat a healthy high  fiber diet.    Do not drink alcohol (beer, wine, liquor) or use tobacco products.    WOUND CARE  You can shower 24 hours after surgery and get the dressings wet.    The skin glue will stay on for 10 to 14 days after surgery.     Soap and water can get on the incisions but do not scrub the wounds. No hair dye or chemicals of any kind should get on the incisions.     Do not apply any topical ointments such as Neosporin or Hydrogen Peroxide.    Do not swim or submerge the incisions under water for 1 month.    ACTIVITY  Every day you should be up walking around the house. Do not lie in bed all day. Staying active prevents blood clots and pneumonia.    You can go up and down stairs. Do not lift more than 20 pounds or perform strenuous activity that requires straining the core muscles.    You may ride in a car but should not drive the car for at least one week.     APPOINTMENT  Please call our office at [phone] soon to make an appointment.    For questions or concerns please call our office between 8:30 a.m. and 5 p.m. Monday through Friday. The number above directs to the answering service after hours to reach the on-call physician.    Please call our office immediately for fever greater than 100.5, excess bleeding, inability to urinate, severe abdominal pain, severe diarrhea, uncontrollable vomiting.      For life threatening emergencies such as severe chest pain, difficulty breathing, or loss of conciousness call [phone].

## 2024-10-23 NOTE — PLAN OF CARE
Patient back from surgery, drowsy but arousable and tolerating clear liquids.  VSS.  Patient c/o pain and asking for tylenol with codeine, spouse picked up oxycodone from pharmacy that was ordered by surgery but patient states she can't take it, and states it makes her nauseous.  Called [facility] and informed Dr. Blevins that prescription was picked up but is not tolerated by patient.  Dr. Hutchenson called new prescription in for patient.  Patient and spouse informed.  Patient beginning to eat solid foods.  If she tolerates this and voids and pain is controlled, patient will be discharged home.

## 2024-10-23 NOTE — PAYOR COMM NOTE
--------------  ADMISSION REVIEW     Payor: LAZARO ROWLEY  Subscriber #:  [id number]  Authorization Number: [id number]    Admit date: 10/22/24  Admit time: 10:47 PM       REVIEW DOCUMENTATION:    Patient Seen in: [facility] Emergency Department      History     Chief Complaint   Patient presents with    Abdomen/Flank Pain     Stated Complaint: abdominal pain    Subjective:   HPI      The patient is a 54-year-old female presented to the emergency room with reports of abdominal pain.  Symptoms started yesterday as she was in the airport on her way to the flight.  Pain was severe at that time a and located in epigastric region.  EMS did evaluate her at the airport but has her vitals were fine patient opted to just go home.  Patient is taken some Pepto-Bismol and states that she ate very little and slept the night away.    Objective:     History reviewed. No pertinent past medical history.    Past Surgical History:   Procedure Laterality Date    Repair, complex, scalp/arms/legs, add'l 5.0 cm/<  11/5/2010    Performed by DENZEL BAIG at [facility] [place] [place]    Repr cmpl wnd scalp,extr 2.6-7.5  11/5/2010    Performed by DENZEL BAIG at [facility], [facility]       Physical Exam     ED Triage Vitals [10/22/24 1915]   /78   Pulse 73   Resp 16   Temp 98.9 °F (37.2 °C)   Temp src Oral   SpO2 99 %   O2 Device None (Room air)       Current Vitals:   Vital Signs  BP: 118/78  Pulse: 73  Resp: 16  Temp: 98.9 °F (37.2 °C)  Temp src: Oral    Oxygen Therapy  SpO2: 99 %  O2 Device: None (Room air)        Physical Exam  Vitals and nursing note reviewed.   Constitutional:       General: She is not in acute distress.     Appearance: Normal appearance.   HENT:      Head: Normocephalic.      Nose: Nose normal.      Mouth/Throat:      Mouth: Mucous membranes are moist.   Eyes:      Extraocular Movements: Extraocular movements intact.      Pupils: Pupils are equal, round, and reactive to light.   Cardiovascular:       Rate and Rhythm: Normal rate and regular rhythm.      Pulses: Normal pulses.   Pulmonary:      Effort: Pulmonary effort is normal.   Abdominal:      General: Abdomen is flat. Bowel sounds are normal. There is no distension.      Palpations: Abdomen is soft.      Tenderness: There is abdominal tenderness in the epigastric area. There is no right CVA tenderness, left CVA tenderness, guarding or rebound.      Hernia: No hernia is present.   Musculoskeletal:         General: No swelling or tenderness. Normal range of motion.      Cervical back: Normal range of motion.   Skin:     General: Skin is warm and dry.   Neurological:      Mental Status: She is alert and oriented to person, place, and time. Mental status is at baseline.   Psychiatric:         Mood and Affect: Mood normal.       ED Course     Labs Reviewed   COMP METABOLIC PANEL (14) - Abnormal; Notable for the following components:       Result Value    Glucose 120 (*)     Potassium 3.0 (*)     BUN 8 (*)      (*)      (*)     Alkaline Phosphatase 131 (*)     Bilirubin, Total 1.7 (*)     All other components within normal limits   LIPASE - Abnormal; Notable for the following components:    Lipase 54 (*)     All other components within normal limits   URINALYSIS WITH CULTURE REFLEX - Abnormal; Notable for the following components:    Clarity Urine Turbid (*)     Leukocyte Esterase Urine 500 (*)     WBC Urine >50 (*)     RBC Urine 6-10 (*)     Bacteria Urine 2+ (*)     Squamous Epi. Cells Few (*)     All other components within normal limits   ICTOTEST - Normal   POCT PREGNANCY URINE - Normal     EKG    Rate, intervals and axes as noted on EKG Report.  Rate: 65  Rhythm: Sinus Rhythm  Reading: no zachary/dep or t wave inversions    US ABDOMEN COMPLETE (CPT=76700)    Result Date: 10/22/2024  CONCLUSION:  Gallstones, pericholecystic fluid or gallbladder wall thickening.  The findings are suggestive of cholecystitis.  If there is persistent clinical concern  then consider HIDA scan.            Labs  T. bili 1.7 and AST ALT in the 400 range, white blood cell count is normal, paced 54    Disposition and Plan     Clinical Impression:  1. Acute cholecystitis         Disposition:  Admit  10/22/2024 10:01 pm    Signed by Dulce Saenz MD on 10/22/2024 11:13 PM       SURGICAL CONSULT  10-23-24    Chief Complaint:   Abdominal pain     History of Present Illness:  Rachel Polanco is a a(n) 54 year old female who presented to the ER with 2 days of intermittent abdominal pain.     The patient states her pain began Monday evening when she was at [facility] about to travel out of town for work.  She states the pain was periumbilical and severe.  She was diaphoretic.  She states she was evaluated by paramedics and the airport who attributed her pain to gas pain and recommended follow-up if her pain persisted.    General: Alert, orientated x3.  Cooperative.  Not in apparent distress.     HEENT: Exam is unremarkable.  Without scleral icterus.  Mucous membranes are moist. EOM are WNL.     Neck: No tenderness to palpitation.  Full range of motion to flexion and extension, lateral rotation and lateral flexion of cervical spine.  No JVD. Supple.      Lungs: No secondary use of accessory respiratory musculature.     Abdomen: Soft, nondistended without tympany to percussion.  Abdomen is nontender to palpation throughout.  No rebound or guarding.  No peritoneal signs.     Extremities:  No lower extremity edema noted.  Without clubbing or cyanosis.       Skin: Normal texture and turgor.     Lab 10/23/24  0506   GLU 88   BUN 11   CREATSERUM 0.68   CA 8.1*   ALB 3.7      K 3.2*   *   CO2 25.0   ALKPHO 100   *   *   BILT 0.7   TP 6.3      54 year old female who presents with 2 day history of abdominal pain in the right upper quadrant and the epigastrium. Pain was worse after eating. She is tender in the right upper quadrant. I reviewed her ultrasound and this shows  cholelithiasis, gallbladder wall thickening and pericholecystic fluid consistent with acute cholecystitis. The common bile duct of normal caliber. Her LFTs were elevated with t bili at 1.7 but now down trending with a normalized total bilirubin. The benefits of surgical intervention to address the underlying pathology were discussed with the patient. The alternatives to surgery including non-operative treatment     All pertinent questions were answered to the patient's satisfaction after which the patient provided willing and informed consent to proceed with surgery.       MEDICATIONS ADMINISTERED IN LAST 1 DAY:  ceFEPIme (Maxpime) 2 g in sodium chloride 0.9% 100 mL IVPB-MBP       Date Action Dose Route User    10/22/2024 2219 New Bag 2 g Intravenous Lamont Rush, BUD          famotidine (Pepcid) 20 mg/2mL injection 20 mg       Date Action Dose Route User    10/22/2024 2005 Given 20 mg Intravenous Maria M Mclaughlin RN          piperacillin-tazobactam (Zosyn) 3.375 g in dextrose 5% 100 mL IVPB-ADDV       Date Action Dose Route User    10/23/2024 0300 New Bag 3.375 g Intravenous Temo Robins RN          sodium chloride 0.9 % IV bolus 1,000 mL       Date Action Dose Route User    10/22/2024 2006 New Bag 1,000 mL Intravenous Maria M Mclaughlin RN            Vitals (last day)       Date/Time Temp Pulse Resp BP SpO2 Weight O2 Device O2 Flow Rate (L/min) [facility]    10/23/24 0500 98.6 °F (37 °C) 63 16 97/75 96 % -- None (Room air) -- MS    10/22/24 2300 99 °F (37.2 °C) 81 16 115/77 100 % -- None (Room air) -- MS    10/22/24 2254 -- -- -- -- -- 132 lb (59.9 kg) -- -- MS    10/22/24 2154 -- -- -- -- -- -- None (Room air) -- TO    10/22/24 1915 98.9 °F (37.2 °C) 73 16 118/78 99 % 132 lb (59.9 kg) None (Room air) -- TC

## 2024-10-23 NOTE — PLAN OF CARE
Patient A&O x4, room air, no c/o pain since coming to unit.  Patient went to immediate care, who sent her to ER for enflamed roslyn.  Plan for removal today.

## 2024-10-24 LAB
ATRIAL RATE: 65 BPM
P AXIS: 54 DEGREES
P-R INTERVAL: 124 MS
Q-T INTERVAL: 410 MS
QRS DURATION: 74 MS
QTC CALCULATION (BEZET): 426 MS
R AXIS: 36 DEGREES
T AXIS: 24 DEGREES
VENTRICULAR RATE: 65 BPM

## 2024-10-24 NOTE — PROGRESS NOTES
Patient ambulating in room, voiding in bathroom, tolerated soft diet, pain well controlled at this time. Discharge instructions given, all patient and family questions answered. Will DC at this time.

## 2024-10-30 ENCOUNTER — TELEPHONE (OUTPATIENT)
Facility: LOCATION | Age: 54
End: 2024-10-30

## 2024-10-30 NOTE — TELEPHONE ENCOUNTER
S/W .  Parveen() reports two of post surgical incisions appear infected.   denies fever, chills, nausea, vomiting.   advised to upload photo to patients Yamini Saini verbalized understanding.

## 2024-11-06 ENCOUNTER — OFFICE VISIT (OUTPATIENT)
Facility: LOCATION | Age: 54
End: 2024-11-06
Payer: COMMERCIAL

## 2024-11-06 VITALS
WEIGHT: 132 LBS | DIASTOLIC BLOOD PRESSURE: 68 MMHG | OXYGEN SATURATION: 99 % | TEMPERATURE: 98 F | SYSTOLIC BLOOD PRESSURE: 104 MMHG | HEART RATE: 69 BPM | HEIGHT: 63 IN | BODY MASS INDEX: 23.39 KG/M2 | RESPIRATION RATE: 18 BRPM

## 2024-11-06 DIAGNOSIS — Z98.890 POST-OPERATIVE STATE: Primary | ICD-10-CM

## 2024-11-06 PROCEDURE — 99024 POSTOP FOLLOW-UP VISIT: CPT

## 2024-11-06 NOTE — PROGRESS NOTES
Post Operative Visit Note       Active Problems  1. Post-operative state         Chief Complaint   Chief Complaint   Patient presents with    Post-Op      PO SX Lap Choley w/ Dr Gayle            History of Present Illness   54 year old female presenting for follow up visit s/p laparoscopic cholecystectomy on 10/23/2024 with Dr. Gayle.    Patient is currently doing well and has no concerns at this time. She denies abdominal pain and is not taking pain medication at this time. She is tolerating a regular diet. She denies nausea, vomiting, fever, and chills. She denies diarrhea and constipation.   She reports previously having a yellow discoloration to the center of her incisions which has since resolved. She denies drainage.    Patient is eager to resume physical activity.    She is concerned about traveling on airplane for work.      Allergies  Rachel is allergic to pseudoephedrine.    Past Medical / Surgical / Social / Family History    The past medical and past surgical history have been reviewed by me today.     History reviewed. No pertinent past medical history.  Past Surgical History:   Procedure Laterality Date    Repair, complex, scalp/arms/legs, add'l 5.0 cm/<  11/5/2010    Performed by DENZEL BAIG at [facility] [place] [facility] [place], [facility]    Repr cmpl wnd scalp,extr 2.6-7.5  11/5/2010    Performed by DENZEL BAIG at [facility], [facility]       The family history and social history have been reviewed by me today.    Family History   Problem Relation Age of Onset    Diabetes Mother     Other (bladder cancer) Mother     Diabetes Father      Social History     Socioeconomic History    Marital status:    Occupational History    Occupation: consulting.    Tobacco Use    Smoking status: Never     Passive exposure: Never    Smokeless tobacco: Never   Vaping Use    Vaping status: Never Used   Substance and Sexual Activity    Alcohol use: No    Drug use: No      No current outpatient medications on  file.      Review of Systems  A 10 point Review of Systems has been completed by me today and is negative except as above in the HPI.    Physical Findings   /68   Pulse 69   Temp 97.9 °F (36.6 °C)   Resp 18   Ht 63\"   Wt 132 lb (59.9 kg)   LMP 10/15/2024   SpO2 99%   BMI 23.38 kg/m²   Gen/psych: alert and oriented, cooperative, no apparent distress  Cardiovascular: regular rate  Respiratory: respirations unlabored, no wheeze  Abdominal: soft, non-tender, non-distended, no guarding/rebound  Incisions: Dermabond present. Incisions are clean, dry, and intact with no erythema or drainage.         Assessment/Plan  1. Post-operative state      Continue general diet as tolerated.  Continue ambulation.  Continue no heavy lifting more than 20 lbs until 6 weeks past procedure date (12/4/2024)  Pathology was discussed with the patient.  Risks and precautions of flying in immediate postoperative period were discussed with patient.  All questions and concerns were addressed.  Follow up as needed.      Follow Up  As needed    TANNER Mccarthy  [facility] General Surgery  11/6/2024  11:38 AM

## 2024-11-14 ENCOUNTER — PATIENT MESSAGE (OUTPATIENT)
Facility: LOCATION | Age: 54
End: 2024-11-14

## 2024-11-15 NOTE — TELEPHONE ENCOUNTER
TANNER Borrego responds:  Okay to write a note stating patient has lifting restriction of no greater than 10 lbs. Patient unable to  bags due to lifting restriction and will require assistance moving her bags.

## (undated) DEVICE — TROCAR: Brand: KII® SLEEVE

## (undated) DEVICE — SUT MCRYL 4-0 18IN PS-2 ABSRB UD 19MM 3/8 CIR

## (undated) DEVICE — TROCAR: Brand: KII FIOS FIRST ENTRY

## (undated) DEVICE — SLEEVE COMPR MD KNEE LEN SGL USE KENDALL SCD

## (undated) DEVICE — ENDOPATH ULTRA VERESS INSUFFLATION NEEDLES WITH LUER LOCK CONNECTORS: Brand: ENDOPATH

## (undated) DEVICE — APPLICATOR PREP 26ML CHG 2% ISO ALC 70%

## (undated) DEVICE — GLOVE SUR 7.5 SENSICARE PI PIP GRN PWD F

## (undated) DEVICE — 40580 - THE PINK PAD - ADVANCED TRENDELENBURG POSITIONING KIT: Brand: 40580 - THE PINK PAD - ADVANCED TRENDELENBURG POSITIONING KIT

## (undated) DEVICE — SUT PDS II 1 36IN ABSRB VLT L36MM CT-1

## (undated) DEVICE — GRABBER GRASPER TIP, DISPOSABLE: Brand: RENEW

## (undated) DEVICE — ADHESIVE SKIN TOP FOR WND CLSR DERMBND ADV

## (undated) DEVICE — DALE ABDOMINAL BINDER, 12" WIDE, STRETCHES TO FIT 30"-45", 1 PER BOX.: Brand: DALE ABDOMINAL BINDER

## (undated) DEVICE — TROCAR: Brand: KII SHIELDED BLADED ACCESS SYSTEM

## (undated) DEVICE — Device: Brand: SUTURE PASSOR PRO

## (undated) DEVICE — CLIP APPLIER WITH CLIP LOGIC TECHNOLOGY: Brand: ENDO CLIP III

## (undated) DEVICE — ANCHOR TISSUE RETRIEVAL SYSTEM, BAG SIZE 175 ML, PORT SIZE 10 MM: Brand: ANCHOR TISSUE RETRIEVAL SYSTEM

## (undated) DEVICE — TRADITIONAL MARYLAND DISSECTOR TIP, DISPOSABLE: Brand: RENEW

## (undated) DEVICE — ENDOCUT SCISSOR TIP, DISPOSABLE: Brand: RENEW

## (undated) DEVICE — #15 STERILE STAINLESS BLADE: Brand: STERILE STAINLESS BLADES

## (undated) DEVICE — GLOVE SUR 7 SENSICARE PI PIP CRM PWD F

## (undated) DEVICE — SOLUTION IRRIG 1000ML 0.9% NACL USP BTL

## (undated) DEVICE — L-HOOK CAUTERY PROBE TIP, DISPOSABLE: Brand: RENEW

## (undated) DEVICE — COVER,LIGHT,CAMERA,HARD,1/PK,STRL: Brand: MEDLINE

## (undated) DEVICE — LAP CHOLE/APPY CDS-LF: Brand: MEDLINE INDUSTRIES, INC.

## (undated) NOTE — LETTER
[address]  [address]  Authorization for Surgical Operation and Procedure     Date:___________                                                                                                         Time:__________  I hereby authorize Surgeon(s):  Grace Gayle MD, my physician and his/her assistants (if applicable), which may include medical students, residents, and/or fellows, to perform the following surgical operation/ procedure and administer such anesthesia as may be determined necessary by my physician:  Operation/Procedure name (s) Procedure(s):  LAPAROSCOPIC CHOLECYSTECTOMY WITH INTRAOPERATIVE CHOLANGIOGRAM, POSSIBLE OPEN on Rachel Polanco   2.   I recognize that during the surgical operation/procedure, unforeseen conditions may necessitate additional or different procedures than those listed above.  I, therefore, further authorize and request that the above-named surgeon, assistants, or designees perform such procedures as are, in their judgment, necessary and desirable.    3.   My surgeon/physician has discussed prior to my surgery the potential benefits, risks and side effects of this procedure; the likelihood of achieving goals; and potential problems that might occur during recuperation.  They also discussed reasonable alternatives to the procedure, including risks, benefits, and side effects related to the alternatives and risks related to not receiving this procedure.  I have had all my questions answered and I acknowledge that no guarantee has been made as to the result that may be obtained.    4.   Should the need arise during my operation/procedure, which includes change of level of care prior to discharge, I also consent to the administration of blood and/or blood products.  Further, I understand that despite careful testing and screening of blood or blood products by collecting agencies, I may still be subject to ill effects as a result of receiving a  blood transfusion and/or blood products.  The following are some, but not all, of the potential risks that can occur: fever and allergic reactions, hemolytic reactions, transmission of diseases such as Hepatitis, AIDS and Cytomegalovirus (CMV) and fluid overload.  In the event that I wish to have an autologous transfusion of my own blood, or a directed donor transfusion, I will discuss this with my physician.  Check only if Refusing Blood or Blood Products  I understand refusal of blood or blood products as deemed necessary by my physician may have serious consequences to my condition to include possible death. I hereby assume responsibility for my refusal and release the hospital, its personnel, and my physicians from any responsibility for the consequences of my refusal.          o  Refuse      5.   I authorize the use of any specimen, organs, tissues, body parts or foreign objects that may be removed from my body during the operation/procedure for diagnosis, research or teaching purposes and their subsequent disposal by hospital authorities.  I also authorize the release of specimen test results and/or written reports to my treating physician on the hospital medical staff or other referring or consulting physicians involved in my care, at the discretion of the Pathologist or my treating physician.    6.   I consent to the photographing or videotaping of the operations or procedures to be performed, including appropriate portions of my body for medical, scientific, or educational purposes, provided my identity is not revealed by the pictures or by descriptive texts accompanying them.  If the procedure has been photographed/videotaped, the surgeon will obtain the original picture, image, videotape or CD.  The hospital will not be responsible for storage, release or maintenance of the picture, image, tape or CD.    7.   I consent to the presence of a  or observers in the operating room as deemed  necessary by my physician or their designees.    8.   I recognize that in the event my procedure results in extended X-Ray/fluoroscopy time, I may develop a skin reaction.    9. If I have a Do Not Attempt Resuscitation (DNAR) order in place, that status will be suspended while in the operating room, procedural suite, and during the recovery period unless otherwise explicitly stated by me (or a person authorized to consent on my behalf). The surgeon or my attending physician will determine when the applicable recovery period ends for purposes of reinstating the DNAR order.  10. Patients having a sterilization procedure: I understand that if the procedure is successful the results will be permanent and it will therefore be impossible for me to inseminate, conceive, or bear children.  I also understand that the procedure is intended to result in sterility, although the result has not been guaranteed.   11. I acknowledge that my physician has explained sedation/analgesia administration to me including the risk and benefits I consent to the administration of sedation/analgesia as may be necessary or desirable in the judgment of my physician.    I CERTIFY THAT I HAVE READ AND FULLY UNDERSTAND THE ABOVE CONSENT TO OPERATION and/or OTHER PROCEDURE.    _________________________________________  __________________________________  Signature of Patient     Signature of Responsible Person         ___________________________________         Printed Name of Responsible Person           _________________________________                 Relationship to Patient  _________________________________________  ______________________________  Signature of Witness          Date  Time      Patient Name: Rachel TIAN Polanco     : 10/7/1970                 Printed: 2024     Medical Record #: [id number]                     Page 1 of 2                                    [address]   [address]    Consent for Anesthesia    IRachel agree to be cared for by an anesthesiologist, who is specially trained to monitor me and give me medicine to put me to sleep or keep me comfortable during my procedure    I understand that my anesthesiologist is not an employee or agent of [facility] or [employer]. He or she works for [employer].    As the patient asking for anesthesia services, I agree to:  Allow the anesthesiologist (anesthesia doctor) to give me medicine and do additional procedures as necessary. Some examples are: Starting or using an “IV” to give me medicine, fluids or blood during my procedure, and having a breathing tube placed to help me breathe when I’m asleep (intubation). In the event that my heart stops working properly, I understand that my anesthesiologist will make every effort to sustain my life, unless otherwise directed by [facility] Do Not Resuscitate documents.  Tell my anesthesia doctor before my procedure:  If I am pregnant.  The last time that I ate or drank.  All of the medicines I take (including prescriptions, herbal supplements, and pills I can buy without a prescription (including street drugs/illegal medications). Failure to inform my anesthesiologist about these medicines may increase my risk of anesthetic complications.  If I am allergic to anything or have had a reaction to anesthesia before.  I understand how the anesthesia medicine will help me (benefits).  I understand that with any type of anesthesia medicine there are risks:  The most common risks are: nausea, vomiting, sore throat, muscle soreness, damage to my eyes, mouth, or teeth (from breathing tube placement).  Rare risks include: remembering what happened during my procedure, allergic reactions to medications, injury to my airway, heart, lungs, vision, nerves, or muscles and in extremely rare instances death.  My doctor has explained to me other choices available  to me for my care (alternatives).  Pregnant Patients (“epidural”):  I understand that the risks of having an epidural (medicine given into my back to help control pain during labor), include itching, low blood pressure, difficulty urinating, headache or slowing of the baby’s heart. Very rare risks include infection, bleeding, seizure, irregular heart rhythms and nerve injury.  Regional Anesthesia (“spinal”, “epidural”, & “nerve blocks”):  I understand that rare but potential complications include headache, bleeding, infection, seizure, irregular heart rhythms, and nerve injury.    I can change my mind about having anesthesia services at any time before I get the medicine.    _____________________________________________________________________________  Patient (or Representative) Signature/Relationship to Patient  Date   Time    _____________________________________________________________________________   Name (if used)    Language/Organization   Time    _____________________________________________________________________________  Anesthesiologist Signature     Date   Time  I have discussed the procedure and information above with the patient (or patient’s representative) and answered their questions. The patient or their representative has agreed to have anesthesia services.    _____________________________________________________________________________  Witness        Date   Time  I have verified that the signature is that of the patient or patient’s representative, and that it was signed before the procedure  Patient Name: Rachel Polanco     : 10/7/1970                 Printed: 2024     Medical Record #: [id number]                     Page 2 of 2